# Patient Record
Sex: MALE | Race: WHITE | NOT HISPANIC OR LATINO | Employment: FULL TIME | URBAN - METROPOLITAN AREA
[De-identification: names, ages, dates, MRNs, and addresses within clinical notes are randomized per-mention and may not be internally consistent; named-entity substitution may affect disease eponyms.]

---

## 2018-04-03 ENCOUNTER — OFFICE VISIT (OUTPATIENT)
Dept: PEDIATRICS | Facility: CLINIC | Age: 28
End: 2018-04-03
Payer: COMMERCIAL

## 2018-04-03 VITALS
SYSTOLIC BLOOD PRESSURE: 130 MMHG | HEART RATE: 67 BPM | WEIGHT: 183 LBS | BODY MASS INDEX: 24.79 KG/M2 | OXYGEN SATURATION: 97 % | TEMPERATURE: 97.8 F | HEIGHT: 72 IN | DIASTOLIC BLOOD PRESSURE: 74 MMHG

## 2018-04-03 DIAGNOSIS — Z00.00 ROUTINE GENERAL MEDICAL EXAMINATION AT A HEALTH CARE FACILITY: Primary | ICD-10-CM

## 2018-04-03 DIAGNOSIS — R07.9 CHEST PAIN, UNSPECIFIED TYPE: ICD-10-CM

## 2018-04-03 LAB
ALBUMIN SERPL-MCNC: 4.6 G/DL (ref 3.4–5)
ALP SERPL-CCNC: 55 U/L (ref 40–150)
ALT SERPL W P-5'-P-CCNC: 30 U/L (ref 0–70)
ANION GAP SERPL CALCULATED.3IONS-SCNC: 5 MMOL/L (ref 3–14)
AST SERPL W P-5'-P-CCNC: 23 U/L (ref 0–45)
BILIRUB SERPL-MCNC: 0.5 MG/DL (ref 0.2–1.3)
BUN SERPL-MCNC: 12 MG/DL (ref 7–30)
CALCIUM SERPL-MCNC: 9.8 MG/DL (ref 8.5–10.1)
CHLORIDE SERPL-SCNC: 104 MMOL/L (ref 94–109)
CHOLEST SERPL-MCNC: 176 MG/DL
CO2 SERPL-SCNC: 30 MMOL/L (ref 20–32)
CREAT SERPL-MCNC: 0.94 MG/DL (ref 0.66–1.25)
GFR SERPL CREATININE-BSD FRML MDRD: >90 ML/MIN/1.7M2
GLUCOSE SERPL-MCNC: 87 MG/DL (ref 70–99)
HDLC SERPL-MCNC: 82 MG/DL
LDLC SERPL CALC-MCNC: 81 MG/DL
NONHDLC SERPL-MCNC: 94 MG/DL
POTASSIUM SERPL-SCNC: 4.3 MMOL/L (ref 3.4–5.3)
PROT SERPL-MCNC: 7.9 G/DL (ref 6.8–8.8)
SODIUM SERPL-SCNC: 139 MMOL/L (ref 133–144)
TRIGL SERPL-MCNC: 64 MG/DL

## 2018-04-03 PROCEDURE — 93000 ELECTROCARDIOGRAM COMPLETE: CPT | Performed by: INTERNAL MEDICINE

## 2018-04-03 PROCEDURE — 36415 COLL VENOUS BLD VENIPUNCTURE: CPT | Performed by: INTERNAL MEDICINE

## 2018-04-03 PROCEDURE — 80053 COMPREHEN METABOLIC PANEL: CPT | Performed by: INTERNAL MEDICINE

## 2018-04-03 PROCEDURE — 99395 PREV VISIT EST AGE 18-39: CPT | Performed by: INTERNAL MEDICINE

## 2018-04-03 PROCEDURE — 99213 OFFICE O/P EST LOW 20 MIN: CPT | Mod: 25 | Performed by: INTERNAL MEDICINE

## 2018-04-03 PROCEDURE — 80061 LIPID PANEL: CPT | Performed by: INTERNAL MEDICINE

## 2018-04-03 NOTE — MR AVS SNAPSHOT
After Visit Summary   4/3/2018    Ryan Walsh    MRN: [id number]           Patient Information     Date Of Birth          1990        Visit Information        Provider Department      4/3/2018 8:50 AM Petey Cano MD [facility]        Today's Diagnoses     Chest pain, unspecified type    -  1    Routine general medical examination at a health care facility          Care Instructions    1) Check out some of the of the Ev Sunil for the shoulder mobility and using foam roller/lacrosse ball    2) Labs downstairs today    3) Electrical tracing can be repeated next week if having ongoing symptoms. I will have cardiology look at the electrical tracing as well and I will let you know if any concerns    4) Ibuprofen 600 mg three times per day with food for 1 week to try to get inflammation down in the shoulder/back area as this may be causing current symptoms.    Petey Cano MD      Preventive Health Recommendations  Male Ages 26 - 39    Yearly exam:             See your health care provider every year in order to  o   Review health changes.   o   Discuss preventive care.    o   Review your medicines if your doctor has prescribed any.    You should be tested each year for STDs (sexually transmitted diseases), if you re at risk.     After age 35, talk to your provider about cholesterol testing. If you are at risk for heart disease, have your cholesterol tested at least every 5 years.     If you are at risk for diabetes, you should have a diabetes test (fasting glucose).  Shots: Get a flu shot each year. Get a tetanus shot every 10 years.     Nutrition:    Eat at least 5 servings of fruits and vegetables daily.     Eat whole-grain bread, whole-wheat pasta and brown rice instead of white grains and rice.     Talk to your provider about Calcium and Vitamin D.     Lifestyle    Exercise for at least 150 minutes a week (30 minutes a day, 5 days a week). This will help you control  "your weight and prevent disease.     Limit alcohol to one drink per day.     No smoking.     Wear sunscreen to prevent skin cancer.     See your dentist every six months for an exam and cleaning.             Follow-ups after your visit        Who to contact     If you have questions or need follow up information about today's clinic visit or your schedule please contact [facility] directly at [phone].  Normal or non-critical lab and imaging results will be communicated to you by MyChart, letter or phone within 4 business days after the clinic has received the results. If you do not hear from us within 7 days, please contact the clinic through [employer] or phone. If you have a critical or abnormal lab result, we will notify you by phone as soon as possible.  Submit refill requests through [employer] or call your pharmacy and they will forward the refill request to us. Please allow 3 business days for your refill to be completed.          Additional Information About Your Visit        [employer] Information     [employer] lets you send messages to your doctor, view your test results, renew your prescriptions, schedule appointments and more. To sign up, go to [email or web address] . Click on \"Log in\" on the left side of the screen, which will take you to the Welcome page. Then click on \"Sign up Now\" on the right side of the page.     You will be asked to enter the access code listed below, as well as some personal information. Please follow the directions to create your username and password.     Your access code is: [email or web address]  Expires: 2018  9:27 AM     Your access code will  in 90 days. If you need help or a new code, please call your [place] clinic or [phone].        Care EveryWhere ID     This is your Care EveryWhere ID. This could be used by other organizations to access your [place] medical records  [id number]        Your Vitals Were     Pulse Temperature Height Pulse Oximetry BMI " "(Body Mass Index)       67 97.8  F (36.6  C) (Oral) 5' 11.5\" (1.816 m) 97% 25.17 kg/m2        Blood Pressure from Last 3 Encounters:   04/03/18 130/74   08/16/15 110/70    Weight from Last 3 Encounters:   04/03/18 183 lb (83 kg)   08/16/15 177 lb (80.3 kg)              We Performed the Following     Comprehensive metabolic panel     EKG 12-lead complete w/read - Clinics     EKG 12-lead complete w/read - Clinics     Lipid panel reflex to direct LDL Fasting        Primary Care Provider Office Phone # Fax #    [facility] [phone] [address] [address]        Equal Access to Services     EMMIE NOVAK : Hadii aad ku hadasho Soomaali, waaxda luqadaha, qaybta kaalmada adeegyada, blanca helms. So [facility] [phone].    ATENCIÓN: Si habla español, tiene a ram disposición servicios gratuitos de asistencia lingüística. [facility] [phone].    We comply with applicable federal civil rights laws and [place] laws. We do not discriminate on the basis of race, color, national origin, age, disability, sex, sexual orientation, or gender identity.            Thank you!     Thank you for choosing [facility]  for your care. Our goal is always to provide you with excellent care. Hearing back from our patients is one way we can continue to improve our services. Please take a few minutes to complete the written survey that you may receive in the mail after your visit with us. Thank you!             Your Updated Medication List - Protect others around you: Learn how to safely use, store and throw away your medicines at www.disposemymeds.org.          This list is accurate as of 4/3/18 10:08 AM.  Always use your most recent med list.                   Brand Name Dispense Instructions for use Diagnosis    order for DME     1 Device    Equipment being ordered: crutches    Cellulitis of left foot, Foot laceration, left, initial encounter       "

## 2018-04-03 NOTE — PATIENT INSTRUCTIONS
1) Check out some of the of the Ev Barber for the shoulder mobility and using foam roller/lacrosse ball    2) Labs downstairs today    3) Electrical tracing can be repeated next week if having ongoing symptoms. I will have cardiology look at the electrical tracing as well and I will let you know if any concerns    4) Ibuprofen 600 mg three times per day with food for 1 week to try to get inflammation down in the shoulder/back area as this may be causing current symptoms.    Petey Cano MD      Preventive Health Recommendations  Male Ages 26 - 39    Yearly exam:             See your health care provider every year in order to  o   Review health changes.   o   Discuss preventive care.    o   Review your medicines if your doctor has prescribed any.    You should be tested each year for STDs (sexually transmitted diseases), if you re at risk.     After age 35, talk to your provider about cholesterol testing. If you are at risk for heart disease, have your cholesterol tested at least every 5 years.     If you are at risk for diabetes, you should have a diabetes test (fasting glucose).  Shots: Get a flu shot each year. Get a tetanus shot every 10 years.     Nutrition:    Eat at least 5 servings of fruits and vegetables daily.     Eat whole-grain bread, whole-wheat pasta and brown rice instead of white grains and rice.     Talk to your provider about Calcium and Vitamin D.     Lifestyle    Exercise for at least 150 minutes a week (30 minutes a day, 5 days a week). This will help you control your weight and prevent disease.     Limit alcohol to one drink per day.     No smoking.     Wear sunscreen to prevent skin cancer.     See your dentist every six months for an exam and cleaning.

## 2018-04-03 NOTE — PROGRESS NOTES
SUBJECTIVE:   Ryan Walsh is a 28 year old male who presents to clinic today for the following health issues:    CC: Ryan Walsh is an 28 year old male who presents for preventative health visit.     Healthy Habits:    Do you get at least three servings of calcium containing foods daily (dairy, green leafy vegetables, etc.)? yes    Amount of exercise or daily activities, outside of work: 7 day(s) per week    Problems taking medications regularly not applicable    Medication side effects: No    Have you had an eye exam in the past two years? no    Do you see a dentist twice per year? yes    Do you have sleep apnea, excessive snoring or daytime drowsiness?no           Today's PHQ-2 Score:   PHQ-2 ( 1999 Pfizer) 4/3/2018   Q1: Little interest or pleasure in doing things 0   Q2: Feeling down, depressed or hopeless 0   PHQ-2 Score 0       Abuse: Current or Past(Physical, Sexual or Emotional)- No  Do you feel safe in your environment - Yes    Social History   Substance Use Topics     Smoking status: Never Smoker     Smokeless tobacco: Not on file     Alcohol use Not on file      If you drink alcohol do you typically have >3 drinks per day or >7 drinks per week? No                      Last PSA: No results found for: PSA    Reviewed orders with patient. Reviewed health maintenance and updated orders accordingly - Yes  Labs reviewed in EPIC    Reviewed and updated as needed this visit by clinical staff  Allergies  Meds         Reviewed and updated as needed this visit by Provider            ROS:  C: NEGATIVE for fever, chills, change in weight  I: NEGATIVE for worrisome rashes, moles or lesions  E: NEGATIVE for vision changes or irritation  ENT: NEGATIVE for ear, mouth and throat problems  R: NEGATIVE for significant cough or SOB  CV: NEGATIVE for chest pain, palpitations or peripheral edema  GI: NEGATIVE for nausea, abdominal pain, heartburn, or change in bowel habits   male: negative for dysuria,  hematuria, decreased urinary stream, erectile dysfunction, urethral discharge  M: NEGATIVE for significant arthralgias or myalgia  N: NEGATIVE for weakness, dizziness or paresthesias  P: NEGATIVE for changes in mood or affect    Musculoskeletal problem/pain      Duration: 2-3 weeks     Description  Location: left sided primarily, but occasionally in the right side.     Intensity:  moderate    Accompanying signs and symptoms: tightness in the chest, numbness/acheness.     History  Previous similar problem: no   Previous evaluation:  none    Precipitating or alleviating factors:  Trauma or overuse: YES- overuse with exercising    Aggravating factors include: none    Therapies tried and outcome: stretching     Chest feels inflamed and sometimes numb down the arm. Unsure if related to exercising more. Maternal grandfather had heart issues when younger.     Played college sports and exercises daily. Has had some intermittent chest pains before but the last couple of weeks has noted more.     Last PM woke up with some numbness in the chest area. Did not sleep well last PM, better this AM. Is switching jobs right now, which is a good thing.     When exercising- doing well, no chest pains or pressures. No palpitations. No lower extremity edema. No LH or dizziness.     No abdominal pains. No GERD.       Problem list and histories reviewed & adjusted, as indicated.  Additional history: as documented    There is no problem list on file for this patient.    No past surgical history on file.    Social History   Substance Use Topics     Smoking status: Never Smoker     Smokeless tobacco: Not on file     Alcohol use Not on file     No family history on file.        Reviewed and updated as needed this visit by clinical staff  Allergies       Reviewed and updated as needed this visit by Provider         ROS:  Constitutional, HEENT, cardiovascular, pulmonary, GI, , musculoskeletal, neuro, skin, endocrine and psych systems are  "negative, except as otherwise noted.    OBJECTIVE:     /74 (BP Location: Right arm, Cuff Size: Adult Large)  Pulse 67  Temp 97.8  F (36.6  C) (Oral)  Ht 5' 11.5\" (1.816 m)  Wt 183 lb (83 kg)  SpO2 97%  BMI 25.17 kg/m2  Body mass index is 25.17 kg/(m^2).  GENERAL: healthy, alert and no distress  EYES: Eyes grossly normal to inspection, PERRL and conjunctivae and sclerae normal  HENT: ear canals and TM's normal, nose and mouth without ulcers or lesions  NECK: no adenopathy, no asymmetry, masses, or scars and thyroid normal to palpation  RESP: lungs clear to auscultation - no rales, rhonchi or wheezes  CV: regular rate and rhythm, normal S1 S2, no S3 or S4, no murmur, click or rub, no peripheral edema and peripheral pulses strong  ABDOMEN: soft, nontender, no hepatosplenomegaly, no masses and bowel sounds normal  MS: increased rhomboid and cervical muscle tension on the left, no central spine tenderness, normal ROM of the shoulder, mild tenderness over T4 rib area, no gross musculoskeletal defects noted, no edema  SKIN: no suspicious lesions or rashes  NEURO: Normal strength and tone, mentation intact and speech normal  PSYCH: mentation appears normal, affect normal/bright    Diagnostic Test Results:  Results for orders placed or performed in visit on 04/03/18   Lipid panel reflex to direct LDL Fasting   Result Value Ref Range    Cholesterol 176 <200 mg/dL    Triglycerides 64 <150 mg/dL    HDL Cholesterol 82 >39 mg/dL    LDL Cholesterol Calculated 81 <100 mg/dL    Non HDL Cholesterol 94 <130 mg/dL   Comprehensive metabolic panel   Result Value Ref Range    Sodium 139 133 - 144 mmol/L    Potassium 4.3 3.4 - 5.3 mmol/L    Chloride 104 94 - 109 mmol/L    Carbon Dioxide 30 20 - 32 mmol/L    Anion Gap 5 3 - 14 mmol/L    Glucose 87 70 - 99 mg/dL    Urea Nitrogen 12 7 - 30 mg/dL    Creatinine 0.94 0.66 - 1.25 mg/dL    GFR Estimate >90 >60 mL/min/1.7m2    GFR Estimate If Black >90 >60 mL/min/1.7m2    Calcium 9.8 " "8.5 - 10.1 mg/dL    Bilirubin Total 0.5 0.2 - 1.3 mg/dL    Albumin 4.6 3.4 - 5.0 g/dL    Protein Total 7.9 6.8 - 8.8 g/dL    Alkaline Phosphatase 55 40 - 150 U/L    ALT 30 0 - 70 U/L    AST 23 0 - 45 U/L         ASSESSMENT/PLAN:       ICD-10-CM    1. Routine general medical examination at a health care facility Z00.00 Lipid panel reflex to direct LDL Fasting     Comprehensive metabolic panel   2. Chest pain, unspecified type R07.9 EKG 12-lead complete w/read - Clinics     EKG 12-lead complete w/read - Clinics  Most likely related to muscle spasm and radicular symptoms, will verify EKG with cardiology to ensure normal. Recheck EKG if ongoing, does not seem cardiac without exertional symptoms, does not seem to be pericarditis without positional changes and no rub on exam.       COUNSELING:  Reviewed preventive health counseling, as reflected in patient instructions       reports that he has never smoked. He does not have any smokeless tobacco history on file.    Estimated body mass index is 25.17 kg/(m^2) as calculated from the following:    Height as of this encounter: 5' 11.5\" (1.816 m).    Weight as of this encounter: 183 lb (83 kg).   Weight management plan: Discussed healthy diet and exercise guidelines and patient will follow up in 12 months in clinic to re-evaluate.    Counseling Resources:  ATP IV Guidelines  Pooled Cohorts Equation Calculator  FRAX Risk Assessment  ICSI Preventive Guidelines  Dietary Guidelines for Americans, 2010  USDA's MyPlate  ASA Prophylaxis  Lung CA Screening    Petey Cano MD, MD  [facility]  "

## 2018-11-27 ENCOUNTER — TELEPHONE (OUTPATIENT)
Dept: PEDIATRICS | Facility: CLINIC | Age: 28
End: 2018-11-27

## 2018-11-28 ENCOUNTER — OFFICE VISIT (OUTPATIENT)
Dept: PEDIATRICS | Facility: CLINIC | Age: 28
End: 2018-11-28
Payer: COMMERCIAL

## 2018-11-28 VITALS
HEIGHT: 71 IN | SYSTOLIC BLOOD PRESSURE: 118 MMHG | WEIGHT: 187 LBS | OXYGEN SATURATION: 98 % | HEART RATE: 75 BPM | BODY MASS INDEX: 26.18 KG/M2 | TEMPERATURE: 98.3 F | DIASTOLIC BLOOD PRESSURE: 78 MMHG

## 2018-11-28 DIAGNOSIS — Z23 NEED FOR PROPHYLACTIC VACCINATION AND INOCULATION AGAINST INFLUENZA: ICD-10-CM

## 2018-11-28 DIAGNOSIS — R00.2 PALPITATIONS: Primary | ICD-10-CM

## 2018-11-28 PROCEDURE — 90686 IIV4 VACC NO PRSV 0.5 ML IM: CPT | Performed by: PEDIATRICS

## 2018-11-28 PROCEDURE — 90471 IMMUNIZATION ADMIN: CPT | Performed by: PEDIATRICS

## 2018-11-28 PROCEDURE — 36415 COLL VENOUS BLD VENIPUNCTURE: CPT | Performed by: PEDIATRICS

## 2018-11-28 PROCEDURE — 99214 OFFICE O/P EST MOD 30 MIN: CPT | Mod: 25 | Performed by: PEDIATRICS

## 2018-11-28 PROCEDURE — 93000 ELECTROCARDIOGRAM COMPLETE: CPT | Performed by: PEDIATRICS

## 2018-11-28 PROCEDURE — 84443 ASSAY THYROID STIM HORMONE: CPT | Performed by: PEDIATRICS

## 2018-11-28 RX ORDER — VALACYCLOVIR HYDROCHLORIDE 1 G/1
TABLET, FILM COATED ORAL
COMMUNITY
Start: 2018-11-05

## 2018-11-28 NOTE — PATIENT INSTRUCTIONS
You will be called to schedule your echocardiogram and your holter monitor.    Will check you thyroid - blood test today.    Follow-up - dependent on test results

## 2018-11-28 NOTE — PROGRESS NOTES

## 2018-11-28 NOTE — PROGRESS NOTES
"  SUBJECTIVE:   Ryan Walsh is a 28 year old male who presents to clinic today for the following health issues:      Saw Dr Cano in April.  Did EKG, see note.  Notes recently chest wall tightening,  random heart fluttering.      Had no symptoms until a few days ago.  Two days ago had a few episodes of left sided \"fluttering\" - happened randomly throughout the day. Yesterday also had symptoms.  Today no symptoms so far.  He is a healthy individual, +FH dad with a fib.  Non smoker.  Works out a lot - wondering if could be muscle twinge, but also very worried about heart - unable to sleep because thinking about this. He drinks 1-2 glasses of wine a few times per week.  The weekend prior to these episodes had more etoh over the holiday, no drug use. Takes no medication supplements or other performance enhancing drugs.     Problem list and histories reviewed & adjusted, as indicated.  Additional history: as documented    Reviewed and updated as needed this visit by clinical staff       Reviewed and updated as needed this visit by Provider         ROS:  Constitutional, HEENT, cardiovascular, pulmonary, gi and gu systems are negative, except as otherwise noted.    OBJECTIVE:     /78 (BP Location: Right arm, Cuff Size: Adult Large)  Pulse 75  Temp 98.3  F (36.8  C) (Oral)  Ht 5' 11\" (1.803 m)  Wt 187 lb (84.8 kg)  SpO2 98%  BMI 26.08 kg/m2  Body mass index is 26.08 kg/(m^2).  GENERAL APPEARANCE: healthy, alert and no distress  RESP: lungs clear to auscultation - no rales, rhonchi or wheezes  CV: regular rates and rhythm, normal S1 S2, no S3 or S4 and no murmur, click or rub    Diagnostic Test Results:  EKG: NSR, no changes compared to 4/2018    ASSESSMENT/PLAN:       1. Palpitations  EKG normal - since having a few days in a row of symptoms, will get holter.  Check echo for ongoing symptoms as well.  Thyroid check today.  Previous labs in 4/2018 - CMP normal.  - valACYclovir (VALTREX) 1000 mg tablet;   - " EKG 12-lead complete w/read - Clinics  - Echocardiogram Complete; Future  - Holter Monitor 48 hour - Adult; Future  - TSH with free T4 reflex    Patient Instructions   You will be called to schedule your echocardiogram and your holter monitor.    Will check you thyroid - blood test today.    Follow-up - dependent on test results      Quin Nolasco MD  [facility]

## 2018-11-28 NOTE — MR AVS SNAPSHOT
After Visit Summary   11/28/2018    Ryan Walsh    MRN: [id number]           Patient Information     Date Of Birth          1990        Visit Information        Provider Department      11/28/2018 11:40 AM Quin Nolasco MD [facility]        Today's Diagnoses     Palpitations    -  1      Care Instructions    You will be called to schedule your echocardiogram and your holter monitor.    Will check you thyroid - blood test today.    Follow-up - dependent on test results          Follow-ups after your visit        Future tests that were ordered for you today     Open Future Orders        Priority Expected Expires Ordered    Echocardiogram Complete Routine  11/28/2019 11/28/2018    Holter Monitor 48 hour - Adult Routine  1/12/2019 11/28/2018            Who to contact     If you have questions or need follow up information about today's clinic visit or your schedule please contact [facility] directly at [phone].  Normal or non-critical lab and imaging results will be communicated to you by MyChart, letter or phone within 4 business days after the clinic has received the results. If you do not hear from us within 7 days, please contact the clinic through [employer] or phone. If you have a critical or abnormal lab result, we will notify you by phone as soon as possible.  Submit refill requests through [employer] or call your pharmacy and they will forward the refill request to us. Please allow 3 business days for your refill to be completed.          Additional Information About Your Visit        MyChart Information     [employer] gives you secure access to your electronic health record. If you see a primary care provider, you can also send messages to your care team and make appointments. If you have questions, please call your primary care clinic.  If you do not have a primary care provider, please call [phone] and they will assist you.        Care EveryWhere ID   "   This is your Care EveryWhere ID. This could be used by other organizations to access your [place] medical records  [id number]        Your Vitals Were     Pulse Temperature Height Pulse Oximetry BMI (Body Mass Index)       75 98.3  F (36.8  C) (Oral) 5' 11\" (1.803 m) 98% 26.08 kg/m2        Blood Pressure from Last 3 Encounters:   11/28/18 118/78   04/03/18 130/74   08/16/15 110/70    Weight from Last 3 Encounters:   11/28/18 187 lb (84.8 kg)   04/03/18 183 lb (83 kg)   08/16/15 177 lb (80.3 kg)              We Performed the Following     EKG 12-lead complete w/read - Clinics     TSH with free T4 reflex        Primary Care Provider Office Phone # Fax #    Petey Cano [phone] [address] [place] [address]        Equal Access to Services     [facility]: Hadii aad ku hadasho Soomaali, waaxda luqadaha, qaybta kaalmada adeegyada, waxay radhikain hayinderjitn deacon crowley . So [facility] [phone].    ATENCIÓN: Si eusebiola espdahlia, tiene a ram disposición servicios gratuitos de asistencia lingüística. Llame al [phone].    We comply with applicable federal civil rights laws and [place] laws. We do not discriminate on the basis of race, color, national origin, age, disability, sex, sexual orientation, or gender identity.            Thank you!     [facility]. Our goal is always to provide you with excellent care. Hearing back from our patients is one way we can continue to improve our services. Please take a few minutes to complete the written survey that you may receive in the mail after your visit with us. Thank you!             Your Updated Medication List - Protect others around you: Learn how to safely use, store and throw away your medicines at www.disposemymeds.org.          This list is accurate as of 11/28/18 12:33 PM.  Always use your most recent med list.                   Brand Name Dispense Instructions for " use Diagnosis    valACYclovir 1000 mg tablet    VALTREX      Palpitations

## 2018-11-29 ENCOUNTER — HOSPITAL ENCOUNTER (OUTPATIENT)
Dept: CARDIOLOGY | Facility: CLINIC | Age: 28
Discharge: HOME | End: 2018-11-29
Attending: PEDIATRICS | Admitting: PEDIATRICS
Payer: COMMERCIAL

## 2018-11-29 DIAGNOSIS — R00.2 PALPITATIONS: ICD-10-CM

## 2018-11-29 LAB — TSH SERPL DL<=0.005 MIU/L-ACNC: 0.98 MU/L (ref 0.4–4)

## 2018-11-29 PROCEDURE — 93226 XTRNL ECG REC<48 HR SCAN A/R: CPT

## 2018-11-29 PROCEDURE — 93227 XTRNL ECG REC<48 HR R&I: CPT | Performed by: INTERNAL MEDICINE

## 2018-11-29 NOTE — LETTER
[facility]  [address] [place] [address]                  [phone]   December 7, 2018    Ryan Walsh  [address]   [place] [address]      Dear Deon - here is the summary of your Holter monitor. Your rhythm was sinus (which is normal). The non-conducted p-waves noted are called premature atrial contractions.  These are often seen in healthy hearts -- while most people do not feels these beats, some people do - that may be what your are feeling sometimes.     1. Ryan Walsh was monitored for 48:00 hours. The quality of tracing was fair. The principle rhythm was sinus with sinus arrhythmia and intermittent   low atrial rhythm. During this time his average HR was 79 bpm. He had a minimum HR of 40 bpm at 06:54:08 (30-Nov) and a maximum HR of 180 bpm   at 16:58:09 (30-Nov). The AZ interval measured .180 seconds, the QRS duration measured .109 seconds, the QT interval measured .227-.445 seconds.   2. There were 13 pauses greater than 2.0 seconds. The longest pause was 2.65 seconds at 06:54:02 (30-Nov). Pauses contained non conducted P waves.   3. There were 11 isolated SVPBs.   4. There were no ventricular ectopic beats.   5. Patient reports feeling little flutters, the event button was pressed 6 times. The rhythm during these events showed sinus at HR ranging from  bpm   with one event showing a pause with a non conducted P wave.   Non conducted P waves and pauses noted.     Sincerely,     Quin Nolasco MD   Internal Medicine/Pediatrics      Results for orders placed or performed during the hospital encounter of 11/29/18   Holter Monitor 48 hour - Adult   Result Value Ref Range    Interpretation Cardiac Monitor Click View Image link to view waveform and result

## 2018-12-03 ENCOUNTER — HOSPITAL ENCOUNTER (OUTPATIENT)
Dept: CARDIOLOGY | Facility: CLINIC | Age: 28
Discharge: HOME | End: 2018-12-03
Attending: PEDIATRICS | Admitting: PEDIATRICS
Payer: COMMERCIAL

## 2018-12-03 DIAGNOSIS — R00.2 PALPITATIONS: ICD-10-CM

## 2018-12-03 PROCEDURE — 93306 TTE W/DOPPLER COMPLETE: CPT

## 2018-12-03 PROCEDURE — 93306 TTE W/DOPPLER COMPLETE: CPT | Mod: 26 | Performed by: INTERNAL MEDICINE

## 2018-12-05 ENCOUNTER — TELEPHONE (OUTPATIENT)
Dept: PEDIATRICS | Facility: CLINIC | Age: 28
End: 2018-12-05

## 2018-12-05 PROBLEM — I77.810 AORTIC ROOT DILATATION (H): Status: ACTIVE | Noted: 2018-12-05

## 2018-12-05 LAB — INTERPRETATION MONITOR -MUSE: NORMAL

## 2018-12-05 NOTE — TELEPHONE ENCOUNTER
"**Dr. Anderson-would you be able to rajani the patient at all?  He has the following questions:    Call to patient-he has questions regarding borderline dilation and why heavy lifting needs to be stopped.    Patient reports that he was sick last week-was throwing up a few days before the test was done-wondering if there is correlation.    Anything that patient can do to help with further dilation?   Is this common?  Do a lot of people have it and don;t know about this?  What can this lead to in the future?    He turned in the Holter on 12/1-awaiting the results of that as well.    Results: \"Your echo showed that your aortic root is \"borderline dilated\" - I discussed this with our cardiologist - he recommends you get a repeat echocardiogram in 6 months and then if it is stable (no change in size), yearly echos after that.  I will send you a reminder in 6 months to have this repeated. He recommends not heavy weight lifting until you have your repeat echocardiogram in 6 months.\"    Bre Delvalle RN  Message handled by Nurse Triage.      "

## 2018-12-05 NOTE — TELEPHONE ENCOUNTER
Reason for call:  Results   Name of test or procedure: Echocardiogram   Date of test or procedure: 12/3  Location of test or procedure: [facility]    Additional comments: Patient has questions/concerns about results from the Echocardiogram results.    Phone number to reach patient:  Home number on file [phone] (home)    Best Time:  any    Can we leave a detailed message on this number?  ELADIO Lainez  FWF - TC/FD  12/5/2018 12:11 PM

## 2018-12-05 NOTE — TELEPHONE ENCOUNTER
Patient calling on status of request.  Please call him at [phone].  He will be going into a meeting at 4:00 if possible to call before that?

## 2018-12-06 NOTE — TELEPHONE ENCOUNTER
Called 12/6/18 at 258pm - he can try calling back.  I will trying him again tomorrow if we don't touch base today.    Quin Nolasco MD  Internal Medicine/Pediatrics  [facility]

## 2018-12-07 NOTE — TELEPHONE ENCOUNTER
Answered questions below - could not answer why he has this or if it will change - that is why we will do 6 month follow up.  Explained the most of the time this is ideopathic.     Quin Nolasco MD  Internal Medicine/Pediatrics  [facility]

## 2019-05-06 ENCOUNTER — MEDICAL CORRESPONDENCE (OUTPATIENT)
Dept: HEALTH INFORMATION MANAGEMENT | Facility: CLINIC | Age: 29
End: 2019-05-06

## 2019-05-06 DIAGNOSIS — I77.810 DILATED AORTIC ROOT (H): Primary | ICD-10-CM

## 2019-05-15 ENCOUNTER — HOSPITAL ENCOUNTER (OUTPATIENT)
Dept: CARDIOLOGY | Facility: CLINIC | Age: 29
Discharge: HOME | End: 2019-05-15
Attending: FAMILY MEDICINE | Admitting: FAMILY MEDICINE
Payer: COMMERCIAL

## 2019-05-15 DIAGNOSIS — I77.810 DILATED AORTIC ROOT (H): ICD-10-CM

## 2019-05-15 PROCEDURE — 93306 TTE W/DOPPLER COMPLETE: CPT

## 2019-05-15 PROCEDURE — 93306 TTE W/DOPPLER COMPLETE: CPT | Mod: 26 | Performed by: INTERNAL MEDICINE

## 2019-06-05 ENCOUNTER — TELEPHONE (OUTPATIENT)
Dept: PEDIATRICS | Facility: CLINIC | Age: 29
End: 2019-06-05

## 2020-11-29 ENCOUNTER — HEALTH MAINTENANCE LETTER (OUTPATIENT)
Age: 30
End: 2020-11-29

## 2021-09-19 ENCOUNTER — HEALTH MAINTENANCE LETTER (OUTPATIENT)
Age: 31
End: 2021-09-19

## 2021-10-07 DIAGNOSIS — R00.2 PALPITATIONS: Primary | ICD-10-CM

## 2021-11-04 ENCOUNTER — HOSPITAL ENCOUNTER (OUTPATIENT)
Dept: CARDIOLOGY | Facility: HOSPITAL | Age: 31
Discharge: HOME | End: 2021-11-04
Attending: FAMILY MEDICINE | Admitting: FAMILY MEDICINE
Payer: COMMERCIAL

## 2021-11-04 DIAGNOSIS — R00.2 PALPITATIONS: ICD-10-CM

## 2021-11-04 LAB — LVEF ECHO: NORMAL

## 2021-11-04 PROCEDURE — 93306 TTE W/DOPPLER COMPLETE: CPT

## 2021-11-04 PROCEDURE — 93306 TTE W/DOPPLER COMPLETE: CPT | Mod: 26 | Performed by: INTERNAL MEDICINE

## 2022-01-09 ENCOUNTER — HEALTH MAINTENANCE LETTER (OUTPATIENT)
Age: 32
End: 2022-01-09

## 2022-11-21 ENCOUNTER — HEALTH MAINTENANCE LETTER (OUTPATIENT)
Age: 32
End: 2022-11-21

## 2023-04-16 ENCOUNTER — HEALTH MAINTENANCE LETTER (OUTPATIENT)
Age: 33
End: 2023-04-16

## 2023-08-18 ENCOUNTER — MEDICAL CORRESPONDENCE (OUTPATIENT)
Dept: HEALTH INFORMATION MANAGEMENT | Facility: CLINIC | Age: 33
End: 2023-08-18
Payer: COMMERCIAL

## 2023-08-18 DIAGNOSIS — I77.810 DILATED AORTIC ROOT (H): Primary | ICD-10-CM

## 2023-09-15 ENCOUNTER — HOSPITAL ENCOUNTER (OUTPATIENT)
Dept: CARDIOLOGY | Facility: HOSPITAL | Age: 33
Discharge: HOME | End: 2023-09-15
Attending: FAMILY MEDICINE | Admitting: FAMILY MEDICINE
Payer: COMMERCIAL

## 2023-09-15 DIAGNOSIS — I77.810 DILATED AORTIC ROOT (H): ICD-10-CM

## 2023-09-15 LAB — LVEF ECHO: NORMAL

## 2023-09-15 PROCEDURE — 93306 TTE W/DOPPLER COMPLETE: CPT

## 2023-09-15 PROCEDURE — 93306 TTE W/DOPPLER COMPLETE: CPT | Mod: 26 | Performed by: INTERNAL MEDICINE

## 2024-06-23 ENCOUNTER — HEALTH MAINTENANCE LETTER (OUTPATIENT)
Age: 34
End: 2024-06-23

## 2025-07-12 ENCOUNTER — HEALTH MAINTENANCE LETTER (OUTPATIENT)
Age: 35
End: 2025-07-12